# Patient Record
Sex: FEMALE | Race: WHITE
[De-identification: names, ages, dates, MRNs, and addresses within clinical notes are randomized per-mention and may not be internally consistent; named-entity substitution may affect disease eponyms.]

---

## 2017-06-08 ENCOUNTER — APPOINTMENT (OUTPATIENT)
Dept: OBGYN | Facility: CLINIC | Age: 62
End: 2017-06-08

## 2017-06-08 VITALS
SYSTOLIC BLOOD PRESSURE: 119 MMHG | HEIGHT: 60 IN | BODY MASS INDEX: 31.41 KG/M2 | DIASTOLIC BLOOD PRESSURE: 75 MMHG | WEIGHT: 160 LBS

## 2017-06-09 LAB — HPV HIGH+LOW RISK DNA PNL CVX: NEGATIVE

## 2017-06-14 LAB — CYTOLOGY CVX/VAG DOC THIN PREP: NORMAL

## 2018-06-12 ENCOUNTER — APPOINTMENT (OUTPATIENT)
Dept: OBGYN | Facility: CLINIC | Age: 63
End: 2018-06-12
Payer: COMMERCIAL

## 2018-06-12 VITALS — DIASTOLIC BLOOD PRESSURE: 83 MMHG | SYSTOLIC BLOOD PRESSURE: 135 MMHG | WEIGHT: 165 LBS | BODY MASS INDEX: 32.22 KG/M2

## 2018-06-12 PROCEDURE — 99396 PREV VISIT EST AGE 40-64: CPT

## 2018-06-13 LAB — HPV HIGH+LOW RISK DNA PNL CVX: NOT DETECTED

## 2018-06-18 LAB — CYTOLOGY CVX/VAG DOC THIN PREP: NORMAL

## 2019-07-30 ENCOUNTER — APPOINTMENT (OUTPATIENT)
Dept: OBGYN | Facility: CLINIC | Age: 64
End: 2019-07-30
Payer: COMMERCIAL

## 2019-07-30 VITALS
DIASTOLIC BLOOD PRESSURE: 70 MMHG | SYSTOLIC BLOOD PRESSURE: 107 MMHG | WEIGHT: 167 LBS | HEIGHT: 60 IN | BODY MASS INDEX: 32.79 KG/M2

## 2019-07-30 DIAGNOSIS — Z87.09 PERSONAL HISTORY OF OTHER DISEASES OF THE RESPIRATORY SYSTEM: ICD-10-CM

## 2019-07-30 PROCEDURE — 99396 PREV VISIT EST AGE 40-64: CPT

## 2019-07-30 NOTE — PHYSICAL EXAM
[Awake] : awake [Alert] : alert [Acute Distress] : no acute distress [Nipple Discharge] : no nipple discharge [Mass] : no breast mass [Soft] : soft [Axillary LAD] : no axillary lymphadenopathy [Tender] : non tender [Oriented x3] : oriented to person, place, and time [Vulvar Atrophy] : vulvar atrophy [Normal] : uterus [Uterine Adnexae] : were not tender and not enlarged [No Bleeding] : there was no active vaginal bleeding [Atrophy] : atrophy

## 2019-07-31 LAB — HPV HIGH+LOW RISK DNA PNL CVX: NOT DETECTED

## 2019-08-04 LAB — CYTOLOGY CVX/VAG DOC THIN PREP: ABNORMAL

## 2020-08-13 ENCOUNTER — APPOINTMENT (OUTPATIENT)
Dept: OBGYN | Facility: CLINIC | Age: 65
End: 2020-08-13
Payer: MEDICARE

## 2020-08-13 VITALS — SYSTOLIC BLOOD PRESSURE: 118 MMHG | BODY MASS INDEX: 31.25 KG/M2 | DIASTOLIC BLOOD PRESSURE: 79 MMHG | WEIGHT: 160 LBS

## 2020-08-13 DIAGNOSIS — Z01.419 ENCOUNTER FOR GYNECOLOGICAL EXAMINATION (GENERAL) (ROUTINE) W/OUT ABNORMAL FINDINGS: ICD-10-CM

## 2020-08-13 PROCEDURE — G0101: CPT

## 2020-08-13 NOTE — PHYSICAL EXAM
[Awake] : awake [Alert] : alert [Soft] : soft [Oriented x3] : oriented to person, place, and time [Vulvar Atrophy] : vulvar atrophy [Normal] : uterus [Atrophy] : atrophy [No Bleeding] : there was no active vaginal bleeding [Uterine Adnexae] : were not tender and not enlarged [Acute Distress] : no acute distress [Mass] : no breast mass [Nipple Discharge] : no nipple discharge [Tender] : non tender [Axillary LAD] : no axillary lymphadenopathy

## 2020-08-18 LAB — CYTOLOGY CVX/VAG DOC THIN PREP: ABNORMAL

## 2021-10-12 ENCOUNTER — RX RENEWAL (OUTPATIENT)
Age: 66
End: 2021-10-12

## 2021-10-12 DIAGNOSIS — J45.20 MILD INTERMITTENT ASTHMA, UNCOMPLICATED: ICD-10-CM

## 2021-10-13 ENCOUNTER — TRANSCRIPTION ENCOUNTER (OUTPATIENT)
Age: 66
End: 2021-10-13

## 2022-01-27 DIAGNOSIS — R92.2 INCONCLUSIVE MAMMOGRAM: ICD-10-CM

## 2022-05-27 ENCOUNTER — NON-APPOINTMENT (OUTPATIENT)
Age: 67
End: 2022-05-27

## 2022-08-16 ENCOUNTER — APPOINTMENT (OUTPATIENT)
Dept: OBGYN | Facility: CLINIC | Age: 67
End: 2022-08-16

## 2023-03-14 ENCOUNTER — APPOINTMENT (OUTPATIENT)
Dept: OBGYN | Facility: CLINIC | Age: 68
End: 2023-03-14
Payer: MEDICARE

## 2023-03-14 VITALS
WEIGHT: 165 LBS | HEART RATE: 65 BPM | DIASTOLIC BLOOD PRESSURE: 81 MMHG | HEIGHT: 60 IN | SYSTOLIC BLOOD PRESSURE: 137 MMHG | BODY MASS INDEX: 32.39 KG/M2

## 2023-03-14 DIAGNOSIS — Z01.419 ENCOUNTER FOR GYNECOLOGICAL EXAMINATION (GENERAL) (ROUTINE) W/OUT ABNORMAL FINDINGS: ICD-10-CM

## 2023-03-14 PROCEDURE — G0101: CPT

## 2023-03-14 RX ORDER — ALBUTEROL SULFATE 90 UG/1
108 (90 BASE) INHALANT RESPIRATORY (INHALATION)
Qty: 1 | Refills: 3 | Status: ACTIVE | COMMUNITY
Start: 2021-10-12 | End: 1900-01-01

## 2023-03-17 LAB — CYTOLOGY CVX/VAG DOC THIN PREP: ABNORMAL

## 2024-04-16 ENCOUNTER — APPOINTMENT (OUTPATIENT)
Dept: OBGYN | Facility: CLINIC | Age: 69
End: 2024-04-16
Payer: MEDICARE

## 2024-04-16 VITALS
DIASTOLIC BLOOD PRESSURE: 80 MMHG | BODY MASS INDEX: 31.41 KG/M2 | HEIGHT: 60 IN | WEIGHT: 160 LBS | SYSTOLIC BLOOD PRESSURE: 138 MMHG

## 2024-04-16 DIAGNOSIS — N81.11 CYSTOCELE, MIDLINE: ICD-10-CM

## 2024-04-16 DIAGNOSIS — R39.15 URGENCY OF URINATION: ICD-10-CM

## 2024-04-16 DIAGNOSIS — N39.3 STRESS INCONTINENCE (FEMALE) (MALE): ICD-10-CM

## 2024-04-16 DIAGNOSIS — N95.2 POSTMENOPAUSAL ATROPHIC VAGINITIS: ICD-10-CM

## 2024-04-16 PROCEDURE — 99212 OFFICE O/P EST SF 10 MIN: CPT

## 2024-04-16 PROCEDURE — 99214 OFFICE O/P EST MOD 30 MIN: CPT

## 2024-04-16 NOTE — PHYSICAL EXAM
[Appropriately responsive] : appropriately responsive [Alert] : alert [No Acute Distress] : no acute distress [No Lymphadenopathy] : no lymphadenopathy [Regular Rate Rhythm] : regular rate rhythm [No Murmurs] : no murmurs [Clear to Auscultation B/L] : clear to auscultation bilaterally [Soft] : soft [Non-tender] : non-tender [Non-distended] : non-distended [No HSM] : No HSM [No Lesions] : no lesions [No Mass] : no mass [Oriented x3] : oriented x3 [Examination Of The Breasts] : a normal appearance [No Masses] : no breast masses were palpable [Vulvar Atrophy] : vulvar atrophy [Labia Majora] : normal [Labia Minora] : normal [Atrophy] : atrophy [Cystocele] : a cystocele [Normal] : normal [Uterine Adnexae] : normal

## 2024-04-16 NOTE — DISCUSSION/SUMMARY
[FreeTextEntry1] : A healthy lifestyle can contribute to good health.  This involves maintaining good health habits and avoiding unhealthy activity (e.g. smoking, drugs, etc.)  Patient is counselled on a balanced diet, with Vitamin D supplement as needed.  Any activity is exercise and very important. Walking is encourage and should be brisk. as evidence shows that brisk walking is healthier for both body and brain.    Climbing stairs instead of elevators is good weight bearing exercise.  Dental care both at home and at the dentist office is important.  Rest at night of at least 6 hours is indicated.  The department of healthy lifestyle is available to help in creating good habits as well as dealing with problems  Patient is counselled on breast cancer detection. Regular mammogram as recommended by ACOG is important to detect early breast cancer and allow successful treatment. Mammography should be started at age 40.   Sonogram of the breast is sometimes needed as well. Mammogram with or without sonogram should be done every one or two years, depending upon the different recommendations.  I prefer yearly testing.  Slip is given Patient is counselled to be up to date on colonoscopy.  For normal exams, every five or ten years is effective in detecting early colon cancer.  If there are positive findings such as polyps more frequent testing is indicated.  She should discuss this with her primary care physician

## 2024-04-16 NOTE — HISTORY OF PRESENT ILLNESS
[FreeTextEntry1] : she has vaginal and vulvar burning   No severe and olive oil seems to help She has both LI and urgency incontinence but not every day   She will not take estrogen or have surgery

## 2025-06-12 ENCOUNTER — NON-APPOINTMENT (OUTPATIENT)
Age: 70
End: 2025-06-12

## 2025-06-26 ENCOUNTER — APPOINTMENT (OUTPATIENT)
Dept: OBGYN | Facility: CLINIC | Age: 70
End: 2025-06-26
Payer: MEDICARE

## 2025-06-26 ENCOUNTER — NON-APPOINTMENT (OUTPATIENT)
Age: 70
End: 2025-06-26

## 2025-06-26 VITALS
WEIGHT: 165 LBS | BODY MASS INDEX: 32.39 KG/M2 | HEIGHT: 60 IN | DIASTOLIC BLOOD PRESSURE: 72 MMHG | SYSTOLIC BLOOD PRESSURE: 165 MMHG

## 2025-06-26 PROCEDURE — 99459 PELVIC EXAMINATION: CPT

## 2025-06-26 PROCEDURE — 99397 PER PM REEVAL EST PAT 65+ YR: CPT

## 2025-06-30 LAB — CYTOLOGY CVX/VAG DOC THIN PREP: ABNORMAL
